# Patient Record
Sex: FEMALE | Race: AMERICAN INDIAN OR ALASKA NATIVE
[De-identification: names, ages, dates, MRNs, and addresses within clinical notes are randomized per-mention and may not be internally consistent; named-entity substitution may affect disease eponyms.]

---

## 2022-06-12 ENCOUNTER — HOSPITAL ENCOUNTER (EMERGENCY)
Dept: HOSPITAL 5 - ED | Age: 32
Discharge: HOME | End: 2022-06-12
Payer: SELF-PAY

## 2022-06-12 VITALS — SYSTOLIC BLOOD PRESSURE: 128 MMHG | DIASTOLIC BLOOD PRESSURE: 74 MMHG
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
## 2022-06-12 DIAGNOSIS — Z79.899: ICD-10-CM

## 2022-06-12 DIAGNOSIS — J45.909: ICD-10-CM
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
## 2022-06-12 DIAGNOSIS — S96.911A: Primary | ICD-10-CM
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
## 2022-06-12 PROCEDURE — 99282 EMERGENCY DEPT VISIT SF MDM: CPT

## 2022-06-12 NOTE — EMERGENCY DEPARTMENT REPORT
ED Lower Extremity HPI





- General


Chief Complaint: Extremity Injury, Lower


Stated Complaint: RT FOOT INJURY


Time Seen by Provider: 06/12/22 12:58


Source: patient


Mode of arrival: Ambulatory


Limitations: No Limitations





- History of Present Illness


Initial Comments: 


Patient a 32-year-old female who presents for right lateral ankle pain x1 day.  

Patient states she twisted her ankle while walking on last night.  Patient did 

not seek treatment last time she had no pain last night however she woke up this

morning with 5/10 right lateral ankle pain and swelling.  There is no deformity 

no abrasions no lacerations no bleeding.  Patient denies numbness.  Patient is 

partial weightbearing.  Pain is exacerbated by attempted weightbearing.  Pain is

relieved by offloading and rest.  Patient denies other history denies other 

injury.





MD Complaint: ankle injury





- Related Data


                                  Previous Rx's











 Medication  Instructions  Recorded  Last Taken  Type


 


Naproxen 500 mg PO BID PRN #30 tab 06/12/22 Unknown Rx











                                    Allergies











Allergy/AdvReac Type Severity Reaction Status Date / Time


 


No Known Allergies Allergy   Unverified 06/12/22 10:30














ED Review of Systems


ROS: 


Stated complaint: RT FOOT INJURY


Other details as noted in HPI





Constitutional: denies: chills, fever


Eyes: denies: eye pain, eye discharge, vision change


ENT: denies: ear pain, throat pain


Respiratory: denies: cough, shortness of breath, wheezing


Cardiovascular: denies: chest pain, palpitations


Endocrine: no symptoms reported


Gastrointestinal: denies: abdominal pain, nausea, diarrhea


Genitourinary: denies: urgency, dysuria, discharge


Musculoskeletal: other.  denies: back pain, joint swelling, arthralgia


Skin: denies: rash, lesions


Neurological: denies: headache, weakness, paresthesias


Psychiatric: denies: anxiety, depression


Hematological/Lymphatic: denies: easy bleeding, easy bruising





ED Past Medical Hx





- Past Medical History


Previous Medical History?: Yes


Hx Asthma: Yes





- Surgical History


Past Surgical History?: No





- Medications


Home Medications: 


                                Home Medications











 Medication  Instructions  Recorded  Confirmed  Last Taken  Type


 


Naproxen 500 mg PO BID PRN #30 tab 06/12/22  Unknown Rx














ED Physical Exam





- General


Limitations: No Limitations


General appearance: alert, in no apparent distress





- Head


Head exam: Present: atraumatic, normocephalic





- Eye


Eye exam: Present: normal appearance, EOMI


Pupils: Present: normal accommodation





- ENT


ENT exam: Present: mucous membranes moist





- Neck


Neck exam: Present: normal inspection, full ROM.  Absent: tenderness





- Respiratory


Respiratory exam: Present: normal lung sounds bilaterally.  Absent: respiratory 

distress, wheezes





- Cardiovascular


Cardiovascular Exam: Present: regular rate, normal rhythm, normal heart sounds. 

Absent: systolic murmur, diastolic murmur, rubs, gallop





- GI/Abdominal


GI/Abdominal exam: Present: soft, normal bowel sounds.  Absent: distended, 

tenderness





- Rectal


Rectal exam: Present: deferred





- Extremities Exam


Extremities exam: Present: full ROM





- Expanded Lower Extremity Exam


  ** Right


Ankle exam: Present: tenderness, swelling (Right lateral ankle negative Gao

test range of motion is intact distal pulses intact CRT less than 3 seconds 

bilateral).  Absent: abrasion, laceration, ecchymosis, deformity, crepidus, 

dislocation, erythema, anterior draw sign


Foot/Toe exam: Present: full ROM, swelling (Mild swelling in the right lateral).

 Absent: tenderness


Neuro vascular tendon exam: Absent: pulse deficit, motor deficit, sensory 

deficit, tendon deficit


Gait: Positive: observed and limited by pain





- Back Exam


Back exam: Present: normal inspection, full ROM.  Absent: CVA tenderness (R), 

CVA tenderness (L)





- Neurological Exam


Neurological exam: Present: alert, oriented X3, CN II-XII intact, reflexes 

normal.  Absent: motor sensory deficit





- Expanded Neurological Exam


  ** Expanded


Patient oriented to: Present: person, place, time


Speech: Present: fluid speech


Motor strength exam: RLE: 5, LLE: 5


DTR: ankle (R): 1+, ankle (L): 1+


Best Eye Response ([place]): (4) open spontaneously


Best Motor Response ([place]): (6) obeys commands


Best Verbal Response ([place]): (5) oriented


[place] Total: 15





- Psychiatric


Psychiatric exam: Present: normal affect, normal mood





- Skin


Skin exam: Present: warm, dry, intact, normal color.  Absent: rash





ED Course





                                   Vital Signs











  06/12/22





  10:30


 


Temperature 98.7 F


 


Pulse Rate 97 H


 


Respiratory 18





Rate 


 


Blood Pressure 116/80





[Left] 


 


O2 Sat by Pulse 100





Oximetry 














ED Lower Extremity MDM





- Medical Decision Making


Patient declines x-ray exam negative Gao's test distal pulses are intact +2

 CRT less than 2 seconds bilateral flexion extension intact to direct 

opposition.  There is mild right lateral ankle swelling.  Please no ecchymosis 

no step-off no crepitus no deformity.  Patient is partial weightbearing plan 

crutches, NSAIDs as needed, or RICE therapy.  Follow-up with your primary care 

doctor in 2 to 3 days.  Patient verbalized agreement understanding with 

discharge plan.  Patient DC'd home in stable condition at this time.  Patient 

advises pain is tolerable at this time at 3/10





Critical care attestation.: 


If time is entered above; I have spent that time in minutes in the direct care 

of this critically ill patient, excluding procedure time.








ED Disposition


Clinical Impression: 


Right ankle strain


Qualifiers:


 Encounter type: initial encounter Qualified Code(s): S96.911A - Strain of 

unspecified muscle and tendon at ankle and foot level, right foot, initial 

encounter





Disposition: 01 HOME / SELF CARE / HOMELESS


Is pt being admited?: No


Does the pt Need Aspirin: No


Condition: Stable


Instructions:  Elastic Bandage and RICE Therapy, Crutch Use, Adult, Ankle 

Sprain, Easy-to-Read


Additional Instructions: 


Take medications as prescribed call use crutches as directed.  Follow-up with 

your doctor in 2 to 3 days


Prescriptions: 


Naproxen 500 mg PO BID PRN #30 tab


 PRN Reason: Pain


Referrals: 


DORIAN BHAKTA MD [Staff Physician] - 3-5 Days


Forms:  Work/School Release Form(ED)


Time of Disposition: 13:06